# Patient Record
Sex: FEMALE | Race: WHITE | Employment: PART TIME | URBAN - NONMETROPOLITAN AREA
[De-identification: names, ages, dates, MRNs, and addresses within clinical notes are randomized per-mention and may not be internally consistent; named-entity substitution may affect disease eponyms.]

---

## 2023-09-07 ENCOUNTER — HOSPITAL ENCOUNTER (OUTPATIENT)
Dept: MRI IMAGING | Age: 48
Discharge: HOME | End: 2023-09-07
Attending: RADIOLOGY

## 2023-09-07 ENCOUNTER — HOSPITAL ENCOUNTER (OUTPATIENT)
Dept: CT IMAGING | Age: 48
Discharge: HOME | End: 2023-09-07
Attending: RADIOLOGY

## 2023-09-07 ENCOUNTER — OFFICE VISIT (OUTPATIENT)
Dept: NEUROLOGY | Age: 48
End: 2023-09-07
Payer: MEDICAID

## 2023-09-07 VITALS
DIASTOLIC BLOOD PRESSURE: 92 MMHG | HEIGHT: 61 IN | HEART RATE: 81 BPM | BODY MASS INDEX: 23.79 KG/M2 | SYSTOLIC BLOOD PRESSURE: 178 MMHG | WEIGHT: 126 LBS | OXYGEN SATURATION: 99 %

## 2023-09-07 DIAGNOSIS — Z00.6 EXAMINATION FOR NORMAL COMPARISON FOR CLINICAL RESEARCH: ICD-10-CM

## 2023-09-07 DIAGNOSIS — Z91.199 HISTORY OF NONCOMPLIANCE WITH MEDICAL TREATMENT: ICD-10-CM

## 2023-09-07 DIAGNOSIS — R29.90 STROKE-LIKE SYMPTOMS: Primary | ICD-10-CM

## 2023-09-07 PROCEDURE — 99205 OFFICE O/P NEW HI 60 MIN: CPT | Performed by: PSYCHIATRY & NEUROLOGY

## 2023-09-07 PROCEDURE — G8427 DOCREV CUR MEDS BY ELIG CLIN: HCPCS | Performed by: PSYCHIATRY & NEUROLOGY

## 2023-09-07 PROCEDURE — G8420 CALC BMI NORM PARAMETERS: HCPCS | Performed by: PSYCHIATRY & NEUROLOGY

## 2023-09-07 PROCEDURE — 1036F TOBACCO NON-USER: CPT | Performed by: PSYCHIATRY & NEUROLOGY

## 2023-09-07 RX ORDER — HYDROCHLOROTHIAZIDE 25 MG/1
25 TABLET ORAL DAILY
COMMUNITY

## 2023-09-07 RX ORDER — CARVEDILOL 12.5 MG/1
12.5 TABLET ORAL
COMMUNITY
Start: 2020-06-22

## 2023-09-07 RX ORDER — ASPIRIN 81 MG/1
81 TABLET ORAL DAILY
COMMUNITY
Start: 2023-04-12

## 2023-09-07 RX ORDER — CLOPIDOGREL BISULFATE 75 MG/1
75 TABLET ORAL DAILY
COMMUNITY
Start: 2023-04-12

## 2023-09-07 RX ORDER — POTASSIUM CHLORIDE 20 MEQ/1
20 TABLET, EXTENDED RELEASE ORAL DAILY
COMMUNITY
Start: 2023-04-12

## 2023-09-07 NOTE — PATIENT INSTRUCTIONS
Obtain MRI brain CTA head and neck source images from [facility] for review  You need to follow up with your PCP regarding better control of your blood pressure. Continue with current medications  Lipid panel  Homocystine level  Cardiac echo  30 day cardiac event monitor  Modify risk factors for stroke (blood pressure, cholesterol, diabetes, smoking cessation etc.. Control)  Call with any new symptoms or concerns.    Follow up 8 weeks

## 2023-09-11 NOTE — PROGRESS NOTES
intact   Cranial nerve VIII: Hearing: intact    Cranial nerve IX: Palate Elevation intact bilaterally  Cranial nerve XI: Shoulder shrug intact bilaterally  Cranial nerve XII: Tongue midline   neck supple without rigidity, there is no limitation of range of motion of the neck. DTR's are brisk distal and symmetric  Babinski sign negative   Motor exam is 5/5 in the upper and lower extremities. Normal muscle tone. There is no muscle atrophy. Sensory is intact for light touch. Coordination: finger to nose,  intact  Gait and station intact   Abnormal movement none, vibration normal, proprioception normal  Skin - warm, dry to touch, normal coloration, no rashes, no suspicious skin lesions  Superficial temporal artery pulses are normal.   There is no limitation of range of motion of the neck. There is no resting tremor, no pin rolling, no bradykinesia, no Hypohonia, normal blink rate. Musculoskeletal: Has no hand arthritis, no limitation of ROM in any of the four extremities. There is no leg edema. The Heart was regular in rate and rhythm. No heart murmur  Chest Clear, with  good effort. Abdomen soft, intact bowel sounds. We reviewed the patient records from referring provider and available information in the EHR       ASSESSMENT:      Diagnosis Orders   1. Stroke-like symptoms        2. History of noncompliance with medical treatment           This is a 53 y.o. female who presents today for evaluation of stroke like symptoms in March 2023. Initial presetting symptoms was numbness in the right upper and lower extremities. Her symptoms lasted 2 days. MRI brain W/WO contrast done 5/22/23 showed No definite evidence of acute intracranial process or pathologic enhancement. I reviewed the MRI Brain and agree with interpretation, I also reviewed the patient pertinent labs and records in the EHR and from other providers. CTA head and neck W/WO contrast showed no concerning findings.  Per ER documentation, she